# Patient Record
Sex: FEMALE | Employment: OTHER | URBAN - METROPOLITAN AREA
[De-identification: names, ages, dates, MRNs, and addresses within clinical notes are randomized per-mention and may not be internally consistent; named-entity substitution may affect disease eponyms.]

---

## 2020-08-20 ENCOUNTER — TELEPHONE (OUTPATIENT)
Dept: NEUROLOGY | Age: 75
End: 2020-08-20

## 2020-08-20 NOTE — TELEPHONE ENCOUNTER
----- Message from [employer] sent at 8/20/2020 11:48 AM EDT -----  Regarding: Dr. Tamika Spencer  General Message/Vendor Calls    Caller's first and last name:Pola Owusu(daughter)      Reason for call:new pt appt      Callback required yes/no and why:yes      Best contact number(s):[phone]      Details to clarify the request:Pt's daughter requested a call to schedule a new pt appt  for neuro psych evaluation.       [facility]